# Patient Record
Sex: MALE | URBAN - METROPOLITAN AREA
[De-identification: names, ages, dates, MRNs, and addresses within clinical notes are randomized per-mention and may not be internally consistent; named-entity substitution may affect disease eponyms.]

---

## 2020-01-08 ENCOUNTER — OFFICE VISIT (OUTPATIENT)
Dept: URBAN - METROPOLITAN AREA CLINIC 48 | Facility: CLINIC | Age: 80
End: 2020-01-08
Payer: MEDICARE

## 2020-01-08 PROCEDURE — 99204 OFFICE O/P NEW MOD 45 MIN: CPT | Performed by: OPHTHALMOLOGY

## 2020-01-08 PROCEDURE — 92134 CPTRZ OPH DX IMG PST SGM RTA: CPT | Performed by: OPHTHALMOLOGY

## 2020-01-08 RX ORDER — PREDNISOLONE ACETATE 10 MG/ML
1 % SUSPENSION/ DROPS OPHTHALMIC
Qty: 1 | Refills: 3 | Status: INACTIVE
Start: 2020-01-08 | End: 2020-02-12

## 2020-01-08 ASSESSMENT — KERATOMETRY
OS: 42.13
OD: 41.88

## 2020-01-08 ASSESSMENT — INTRAOCULAR PRESSURE
OD: 16
OS: 15

## 2020-01-08 NOTE — IMPRESSION/PLAN
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 OS. possible vein occlusion vs iritis Plan: OCT ordered and performed today. Discussed diagnosis with patient. The clinical exam and OCT is consistent with Cystoid Macular Edema, no evidence of VMT. Discussed treatment options. Discussed injection vs topical NSAID and Corticosteroid eye gtts vs subtenon injection. R/B/A discussed. Patient understands. Pt. elects to proceed with topical therapy for now. Recommends start patient on Pred Forte 1% gtts TID. Rx sent to patients pharmacy. Re-Eval in 1 month. If no improvement we will consider FA and possible kenalog injection at next visit.  Patient agrees with plan

## 2020-02-12 ENCOUNTER — OFFICE VISIT (OUTPATIENT)
Dept: URBAN - METROPOLITAN AREA CLINIC 48 | Facility: CLINIC | Age: 80
End: 2020-02-12
Payer: MEDICARE

## 2020-02-12 DIAGNOSIS — H35.352 CYSTOID MACULAR DEGENERATION, LEFT EYE: Primary | ICD-10-CM

## 2020-02-12 PROCEDURE — 92134 CPTRZ OPH DX IMG PST SGM RTA: CPT | Performed by: OPHTHALMOLOGY

## 2020-02-12 PROCEDURE — 99213 OFFICE O/P EST LOW 20 MIN: CPT | Performed by: OPHTHALMOLOGY

## 2020-02-12 RX ORDER — PREDNISOLONE ACETATE 10 MG/ML
1 % SUSPENSION/ DROPS OPHTHALMIC
Qty: 1 | Refills: 3 | Status: INACTIVE
Start: 2020-02-12 | End: 2020-08-05

## 2020-02-12 ASSESSMENT — INTRAOCULAR PRESSURE
OS: 18
OD: 18

## 2020-02-12 NOTE — IMPRESSION/PLAN
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 OS. possible vein occlusion vs iritis Plan: OCT ordered and performed today. Discussed diagnosis with patient. The clinical exam and OCT is consistent with Cystoid Macular Edema, no evidence of VMT. Recommend continue W topical therapy for now. Recommends continue W/  Pred Forte 1% gtts TID. Re-Eval in 1 month. If no improvement we will consider FA and possible kenalog injection at next visit.  Patient agrees with plan

## 2020-03-18 ENCOUNTER — OFFICE VISIT (OUTPATIENT)
Dept: URBAN - METROPOLITAN AREA CLINIC 48 | Facility: CLINIC | Age: 80
End: 2020-03-18
Payer: MEDICARE

## 2020-03-18 PROCEDURE — 99213 OFFICE O/P EST LOW 20 MIN: CPT | Performed by: OPHTHALMOLOGY

## 2020-03-18 PROCEDURE — 92134 CPTRZ OPH DX IMG PST SGM RTA: CPT | Performed by: OPHTHALMOLOGY

## 2020-03-18 ASSESSMENT — INTRAOCULAR PRESSURE
OD: 19
OS: 20

## 2020-03-18 NOTE — IMPRESSION/PLAN
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 OS. possible vein occlusion vs iritis Plan: OCT ordered and performed today. Discussed diagnosis with patient. The clinical exam and OCT is consistent with Cystoid Macular Edema, no evidence of VMT. Recommend continue taper topical therapy for now. Recommends continue W/  Pred Forte gtts BID. Re-Eval in 1 month. Patient understands and agrees with plan.

## 2020-05-20 ENCOUNTER — OFFICE VISIT (OUTPATIENT)
Dept: URBAN - METROPOLITAN AREA CLINIC 48 | Facility: CLINIC | Age: 80
End: 2020-05-20
Payer: MEDICARE

## 2020-05-20 PROCEDURE — 92134 CPTRZ OPH DX IMG PST SGM RTA: CPT | Performed by: OPHTHALMOLOGY

## 2020-05-20 PROCEDURE — 99213 OFFICE O/P EST LOW 20 MIN: CPT | Performed by: OPHTHALMOLOGY

## 2020-05-20 ASSESSMENT — INTRAOCULAR PRESSURE
OD: 15
OS: 15

## 2020-05-20 NOTE — IMPRESSION/PLAN
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 OS. Plan: OCT ordered and performed today. Discussed diagnosis with patient. The clinical exam and OCT is consistent with Cystoid Macular Edema. Recommend continue to observe at this time, recommend patient return in 2 months, if swelling is still present will recommend restart Pred forte gtts. Patient understands to call if vision worsens. Patient agrees with the plan.

## 2020-08-05 ENCOUNTER — OFFICE VISIT (OUTPATIENT)
Dept: URBAN - METROPOLITAN AREA CLINIC 48 | Facility: CLINIC | Age: 80
End: 2020-08-05
Payer: MEDICARE

## 2020-08-05 PROCEDURE — 92134 CPTRZ OPH DX IMG PST SGM RTA: CPT | Performed by: OPHTHALMOLOGY

## 2020-08-05 PROCEDURE — 99213 OFFICE O/P EST LOW 20 MIN: CPT | Performed by: OPHTHALMOLOGY

## 2020-08-05 RX ORDER — PREDNISOLONE ACETATE 10 MG/ML
1 % SUSPENSION/ DROPS OPHTHALMIC
Qty: 10 | Refills: 3 | Status: ACTIVE
Start: 2020-08-05

## 2020-08-05 ASSESSMENT — INTRAOCULAR PRESSURE
OS: 17
OD: 17

## 2020-08-05 NOTE — IMPRESSION/PLAN
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 OS. possible vein occlusion vs iritis Plan: OCT ordered and performed today. Discussed diagnosis with patient. The clinical exam and OCT is consistent with Cystoid Macular Edema, no evidence of VMT. Recommend start back topical therapy for now. Recommend start  Pred Forte gtts QID. Re-Eval in 1 month. Patient understands and agrees with plan.

## 2020-09-02 ENCOUNTER — OFFICE VISIT (OUTPATIENT)
Dept: URBAN - METROPOLITAN AREA CLINIC 48 | Facility: CLINIC | Age: 80
End: 2020-09-02
Payer: MEDICARE

## 2020-09-02 PROCEDURE — 99213 OFFICE O/P EST LOW 20 MIN: CPT | Performed by: OPHTHALMOLOGY

## 2020-09-02 ASSESSMENT — INTRAOCULAR PRESSURE
OS: 18
OD: 15

## 2020-09-02 NOTE — IMPRESSION/PLAN
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 OS. possible vein occlusion vs iritis Plan: OCT ordered and performed today. Discussed diagnosis with patient. The clinical exam and OCT is consistent with Cystoid Macular Edema, no evidence of VMT. Recommend continue topical therapy for now, Pred Forte gtts QID OS. Patient understands and agrees with plan.

## 2020-09-30 ENCOUNTER — OFFICE VISIT (OUTPATIENT)
Dept: URBAN - METROPOLITAN AREA CLINIC 48 | Facility: CLINIC | Age: 80
End: 2020-09-30
Payer: MEDICARE

## 2020-09-30 PROCEDURE — 99213 OFFICE O/P EST LOW 20 MIN: CPT | Performed by: OPHTHALMOLOGY

## 2020-09-30 PROCEDURE — 92134 CPTRZ OPH DX IMG PST SGM RTA: CPT | Performed by: OPHTHALMOLOGY

## 2020-09-30 ASSESSMENT — INTRAOCULAR PRESSURE
OS: 21
OD: 17

## 2020-09-30 NOTE — IMPRESSION/PLAN
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 OS. possible vein occlusion vs iritis Plan: OCT ordered and performed today. Discussed diagnosis with patient. The clinical exam and OCT is consistent with Cystoid Macular Edema, no evidence of VMT. Recommend continue topical therapy for now, Pred Forte gtts BID OS. Patient understands and agrees with plan.

## 2020-10-30 ENCOUNTER — OFFICE VISIT (OUTPATIENT)
Dept: URBAN - METROPOLITAN AREA CLINIC 48 | Facility: CLINIC | Age: 80
End: 2020-10-30
Payer: MEDICARE

## 2020-10-30 PROCEDURE — 92134 CPTRZ OPH DX IMG PST SGM RTA: CPT | Performed by: OPHTHALMOLOGY

## 2020-10-30 PROCEDURE — 99213 OFFICE O/P EST LOW 20 MIN: CPT | Performed by: OPHTHALMOLOGY

## 2020-10-30 ASSESSMENT — INTRAOCULAR PRESSURE
OS: 19
OD: 16

## 2020-10-30 NOTE — IMPRESSION/PLAN
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 OS. possible vein occlusion vs iritis Plan: OCT ordered and performed today. Discussed diagnosis with patient. The clinical exam and OCT is consistent with Cystoid Macular Edema, no evidence of VMT. Recommend continue topical therapy for now, Pred Forte gtts QD OS. Patient understands and agrees with plan.

## 2020-12-23 ENCOUNTER — OFFICE VISIT (OUTPATIENT)
Dept: URBAN - METROPOLITAN AREA CLINIC 48 | Facility: CLINIC | Age: 80
End: 2020-12-23
Payer: MEDICARE

## 2020-12-23 PROCEDURE — 99213 OFFICE O/P EST LOW 20 MIN: CPT | Performed by: OPHTHALMOLOGY

## 2020-12-23 PROCEDURE — 92134 CPTRZ OPH DX IMG PST SGM RTA: CPT | Performed by: OPHTHALMOLOGY

## 2020-12-23 ASSESSMENT — INTRAOCULAR PRESSURE
OD: 16
OS: 15

## 2020-12-23 NOTE — IMPRESSION/PLAN
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 Left. Plan: OCT ordered and performed today. Discussed diagnosis with patient. The clinical exam and OCT is consistent with resolved Cystoid Macular Edema. Discussed with patient to remain seeing Dr. Mamta Aleman at [facility], for yearly visits. If vision decreases return ASAP. Patient understands and agrees with the plan.